# Patient Record
Sex: MALE | Race: WHITE
[De-identification: names, ages, dates, MRNs, and addresses within clinical notes are randomized per-mention and may not be internally consistent; named-entity substitution may affect disease eponyms.]

---

## 2018-08-04 ENCOUNTER — HOSPITAL ENCOUNTER (OUTPATIENT)
Dept: HOSPITAL 76 - EMS | Age: 58
Discharge: ANOTHER HOSPITAL | End: 2018-08-04
Attending: SURGERY
Payer: COMMERCIAL

## 2018-08-04 ENCOUNTER — HOSPITAL ENCOUNTER (EMERGENCY)
Dept: HOSPITAL 76 - ED | Age: 58
Discharge: HOME | End: 2018-08-04
Payer: COMMERCIAL

## 2018-08-04 VITALS — DIASTOLIC BLOOD PRESSURE: 77 MMHG | SYSTOLIC BLOOD PRESSURE: 118 MMHG

## 2018-08-04 DIAGNOSIS — R55: Primary | ICD-10-CM

## 2018-08-04 DIAGNOSIS — T67.1XXA: Primary | ICD-10-CM
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
## 2018-08-04 LAB
ALBUMIN DIAFP-MCNC: 3.9 G/DL
ALBUMIN/GLOB SERPL: 1.3 {RATIO}
ALP SERPL-CCNC: 87 IU/L
ALT SERPL W P-5'-P-CCNC: 25 IU/L
ANION GAP SERPL CALCULATED.4IONS-SCNC: 9 MMOL/L
AST SERPL W P-5'-P-CCNC: 23 IU/L
BASOPHILS NFR BLD AUTO: 0.2 10^3/UL
BASOPHILS NFR BLD AUTO: 2.1 %
BILIRUB BLD-MCNC: 0.9 MG/DL
BUN SERPL-MCNC: 12 MG/DL
CALCIUM UR-MCNC: 8.6 MG/DL
CHLORIDE SERPL-SCNC: 106 MMOL/L
CO2 SERPL-SCNC: 23 MMOL/L
CREAT SERPLBLD-SCNC: 0.9 MG/DL
EOSINOPHIL # BLD AUTO: 0.2 10^3/UL
EOSINOPHIL NFR BLD AUTO: 2.4 %
ERYTHROCYTE [DISTWIDTH] IN BLOOD BY AUTOMATED COUNT: 13.1 %
GFRSERPLBLD MDRD-ARVRAT: 87 ML/MIN/{1.73_M2}
GLOBULIN SER-MCNC: 3.1 G/DL
GLUCOSE SERPL-MCNC: 83 MG/DL
HGB UR QL STRIP: 15 G/DL
LIPASE SERPL-CCNC: 30 U/L
LYMPHOCYTES # SPEC AUTO: 2.1 10^3/UL
LYMPHOCYTES NFR BLD AUTO: 29.7 %
MCH RBC QN AUTO: 28.7 PG
MCHC RBC AUTO-ENTMCNC: 34.6 G/DL
MCV RBC AUTO: 82.9 FL
MONOCYTES # BLD AUTO: 0.6 10^3/UL
MONOCYTES NFR BLD AUTO: 8.1 %
NEUTROPHILS # BLD AUTO: 4.2 10^3/UL
NEUTROPHILS # SNV AUTO: 7.2 X10^3/UL
NEUTROPHILS NFR BLD AUTO: 57.7 %
PDW BLD AUTO: 8.8 FL
PLATELET # BLD: 215 10^3/UL
PROT SPEC-MCNC: 7 G/DL
RBC MAR: 5.22 10^6/UL
SODIUM SERPLBLD-SCNC: 138 MMOL/L

## 2018-08-04 PROCEDURE — 93005 ELECTROCARDIOGRAM TRACING: CPT

## 2018-08-04 PROCEDURE — 83690 ASSAY OF LIPASE: CPT

## 2018-08-04 PROCEDURE — 36415 COLL VENOUS BLD VENIPUNCTURE: CPT

## 2018-08-04 PROCEDURE — 85025 COMPLETE CBC W/AUTO DIFF WBC: CPT

## 2018-08-04 PROCEDURE — 99283 EMERGENCY DEPT VISIT LOW MDM: CPT

## 2018-08-04 PROCEDURE — 80053 COMPREHEN METABOLIC PANEL: CPT
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
